# Patient Record
Sex: FEMALE | Race: WHITE | Employment: FULL TIME | URBAN - METROPOLITAN AREA
[De-identification: names, ages, dates, MRNs, and addresses within clinical notes are randomized per-mention and may not be internally consistent; named-entity substitution may affect disease eponyms.]

---

## 2020-07-07 ENCOUNTER — VIRTUAL VISIT (OUTPATIENT)
Dept: NEUROLOGY | Age: 30
End: 2020-07-07

## 2020-07-07 DIAGNOSIS — M54.81 BILATERAL OCCIPITAL NEURALGIA: ICD-10-CM

## 2020-07-07 DIAGNOSIS — G43.709 CHRONIC MIGRAINE W/O AURA W/O STATUS MIGRAINOSUS, NOT INTRACTABLE: Primary | ICD-10-CM

## 2020-07-07 DIAGNOSIS — G44.86 CERVICOGENIC HEADACHE: ICD-10-CM

## 2020-07-07 RX ORDER — LEVONORGESTREL AND ETHINYL ESTRADIOL 0.1-0.02MG
KIT ORAL
COMMUNITY
Start: 2020-06-22 | End: 2022-08-10

## 2020-07-07 RX ORDER — RIZATRIPTAN BENZOATE 10 MG/1
TABLET, ORALLY DISINTEGRATING ORAL
Qty: 9 TAB | Refills: 1 | Status: SHIPPED | OUTPATIENT
Start: 2020-07-07 | End: 2022-08-10

## 2020-07-07 RX ORDER — TOPIRAMATE 50 MG/1
50 TABLET, FILM COATED ORAL
COMMUNITY
End: 2022-08-10

## 2020-07-07 RX ORDER — BUTALBITAL, ACETAMINOPHEN AND CAFFEINE 50; 325; 40 MG/1; MG/1; MG/1
1 TABLET ORAL
Qty: 40 TAB | Refills: 0 | Status: SHIPPED | OUTPATIENT
Start: 2020-07-07 | End: 2022-08-10

## 2020-07-07 RX ORDER — CYCLOBENZAPRINE HCL 10 MG
TABLET ORAL
Qty: 60 TAB | Refills: 0 | Status: SHIPPED | OUTPATIENT
Start: 2020-07-07 | End: 2022-08-10

## 2020-07-07 RX ORDER — SUMATRIPTAN 50 MG/1
50 TABLET, FILM COATED ORAL
COMMUNITY
Start: 2020-07-06 | End: 2020-07-07

## 2020-07-07 RX ORDER — KETOROLAC TROMETHAMINE 10 MG/1
10 TABLET, FILM COATED ORAL EVERY 6 HOURS
Qty: 20 TAB | Refills: 0 | Status: SHIPPED | OUTPATIENT
Start: 2020-07-07 | End: 2020-07-12

## 2020-07-07 NOTE — PROGRESS NOTES
Libia Barrera is a 30 y.o. female who was seen by synchronous (real-time) audio-video technology on 7/7/2020 for Migraine (has had migraine for several years and they stopprd/ once stopped breast feeding and back on birth control migraines have started back up) and Other        Assessment & Plan:   Diagnoses and all orders for this visit:    1. Chronic migraine w/o aura w/o status migrainosus, not intractable  -     ketorolac (TORADOL) 10 mg tablet; Take 1 Tab by mouth every six (6) hours for 5 days. Indications: headaches  -     rizatriptan (MAXALT-MLT) 10 mg disintegrating tablet; Take 1 tab at onset of severe headache; may repeat another in 2 hours if headaches persist    2. Bilateral occipital neuralgia  -     ketorolac (TORADOL) 10 mg tablet; Take 1 Tab by mouth every six (6) hours for 5 days. Indications: headaches  -     cyclobenzaprine (FLEXERIL) 10 mg tablet; Take 1 at bedtime x 1 week; then 1 BID  -     butalbital-acetaminophen-caffeine (FIORICET, ESGIC) -40 mg per tablet; Take 1 Tab by mouth every six (6) hours as needed for Headache. 3. Cervicogenic headache  -     ketorolac (TORADOL) 10 mg tablet; Take 1 Tab by mouth every six (6) hours for 5 days. Indications: headaches  -     cyclobenzaprine (FLEXERIL) 10 mg tablet; Take 1 at bedtime x 1 week; then 1 BID  -     butalbital-acetaminophen-caffeine (FIORICET, ESGIC) -40 mg per tablet; Take 1 Tab by mouth every six (6) hours as needed for Headache. Worse headaches are consistent with migraines. Continue topiramate 50 mg twice daily. Discontinue sumatriptan. Trial of rizatriptan 10 mg ODT for abortive therapy. Prescriptions provided. Trial of ketorolac 10 mg every 6 hours x5 days to break the current cycle of her headaches. Other potential maintenance therapy includes Depakote or propranolol as well as newer CGRP medications. Advised to refrain from things that could trigger a headache (dairy, chocolate, wine and etc.).  Advised to keep a headache log. History and exam also reveals elements of occipital neuralgia due to poor anterior head posture. Advised to correct her anterior head posture. Trial of ketorolac as above to break the current cycle of the neuralgia. Trial of Fioricet for abortive therapy. Prescriptions provided. Potential occipital nerve blocks if condition worsens. History and exam also reveals elements of cervicogenic headaches due to poor anterior head posture. Advised to correct her anterior head posture. Use headrest at home, at work and while driving. Use wireless headsets. Do not carry anything on the shoulder. Use only one pillow at most when sleeping. Patient to be emailed brochure for neck and shoulder exercises to do. Patient may benefit from referral to physical therapy for more aggressive manipulation and dry needling. Trial of cyclobenzaprine 10 mg at bedtime initially and up to 10 mg twice daily if necessary. Prescriptions provided. Advised that we can certainly provide a letter for work in relation to obtaining a seat with a headrest and wireless headsets. Deferred for now. All questions and concerns were answered. This note was created using voice recognition software. Despite editing, there may be syntax errors. Subjective:   Patient is a 41-year-old right-handed white female with a history of migraine headaches who  [facility] for further evaluation of her headaches. Per patient she has had headaches since she started her menstrual cycle. She was just under the care of her previous primary care physician Dr. Albin Tran. She had a had a head CT and brain MRI that was reported to be normal.  Patient was being treated then on topiramate and as needed Imitrex with benefit. Then a few years prior to consult she was taken off medication as she wanted to start a family and was placed on fertility drugs.   During her pregnancies and subsequent deliveries patient migraine subsided. Then last December 2019 her migraines recurred with increase intensity and frequency. She saw her PCP and was restarted on topiramate and placed on Imitrex with no significant benefit. Headaches are sudden in onset. Located either on the right or left behind the eye and/or occiput. Seems to be preceded with feeling tired prior. Described as a pressure, throbbing and pounding headache. Ranging from a 7 to at its worst a 10/10. Lasts for 1 day and would linger for 2 days. Associated with vision changes, light and noise sensitivity and nausea. She would have a headache almost on a daily basis or treat to 4 times per week with severe migraine headaches occurring twice a week. Previous triggers would be caffeine and certain foods like strawberry. Headaches are worse with activity. Ice at the back of the neck or a hot bath would provide some benefit. She would take Excedrin Migraine Tylenol Motrin and other over-the-counter medications with no sustained benefit. Imitrex 50 mg offers no benefit. She is currently on Topamax 50 mg twice daily with no clear benefit but no side effects. Today is the first day she has had no headache. She does admit to problems staying asleep. Averages 5 to 6 hours. Wakes up not feeling rested. 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 Prior to Admission medications    Medication Sig Start Date End Date Taking? Authorizing Provider   topiramate (Topamax) 50 mg tablet Take 50 mg by mouth. Yes Provider, Historical   Prentis Charm 0.1-20 mg-mcg tab TAKE 1 TABLET BY MOUTH EVERY DAY 6/22/20  Yes Provider, Historical   ketorolac (TORADOL) 10 mg tablet Take 1 Tab by mouth every six (6) hours for 5 days.  Indications: headaches 7/7/20 7/12/20 Yes Jewell Salazar MD   cyclobenzaprine (FLEXERIL) 10 mg tablet Take 1 at bedtime x 1 week; then 1 BID 7/7/20  Yes Jewell Salazar MD   rizatriptan (MAXALT-MLT) 10 mg disintegrating tablet Take 1 tab at onset of severe headache; may repeat another in 2 hours if headaches persist 7/7/20  Yes Zi Mcneal MD   butalbital-acetaminophen-caffeine (FIORICET, ESGIC) -40 mg per tablet Take 1 Tab by mouth every six (6) hours as needed for Headache. 7/7/20  Yes Zi Mcneal MD     Past Medical History:   Diagnosis Date    Headache      No past surgical history on file.   Family History   Problem Relation Age of Onset    Cancer Mother     Cancer Paternal Grandmother      Social History     Tobacco Use    Smoking status: Not on file    Smokeless tobacco: Never Used   Substance Use Topics    Alcohol use: Yes     Comment: not every week and less than two when she does       ROS: pertinent as mentioned in the history    Objective:     Patient-Reported Vitals 7/7/2020   Patient-Reported Weight 155lb   Patient-Reported Height 5'        [INSTRUCTIONS:  \"[x]\" Indicates a positive item  \"[]\" Indicates a negative item  -- DELETE ALL ITEMS NOT EXAMINED]    Constitutional: [x] Appears well-developed and well-nourished [x] No apparent distress      [] Abnormal -     Mental status: [x] Alert and awake  [x] Oriented to person/place/time [x] Able to follow commands    [] Abnormal -     Eyes:   EOM    [x]  Normal    [] Abnormal -   Sclera  [x]  Normal    [] Abnormal -          Discharge [x]  None visible   [] Abnormal -     HENT: [x] Normocephalic, atraumatic  [] Abnormal -   [x] Mouth/Throat: Mucous membranes are moist    External Ears [x] Normal  [] Abnormal -    Neck: [x] No visualized mass [] Abnormal -     Pulmonary/Chest: [x] Respiratory effort normal   [x] No visualized signs of difficulty breathing or respiratory distress        [] Abnormal -      Musculoskeletal:   [x] Normal gait with no signs of ataxia         [x] Normal range of motion of neck        [] Abnormal -     Neurological:        [x] No Facial Asymmetry (Cranial nerve 7 motor function) (limited exam due to video visit)          [x] No gaze palsy        [] Abnormal -          Skin:        [x] No significant exanthematous lesions or discoloration noted on facial skin         [] Abnormal -            Psychiatric:       [x] Normal Affect [] Abnormal -        [x] No Hallucinations    Other pertinent observable physical exam findings:- anterior head posture with right shoulder lift and right torticollis    We discussed the expected course, resolution and complications of the diagnosis(es) in detail. Medication risks, benefits, costs, interactions, and alternatives were discussed as indicated. I advised her to contact the office if her condition worsens, changes or fails to improve as anticipated. She expressed understanding with the diagnosis(es) and plan. Veronika Barnett, who was evaluated through a patient-initiated, synchronous (real-time) audio-video encounter, and/or her healthcare decision maker, is aware that it is a billable service, with coverage as determined by her insurance carrier. She provided verbal consent to proceed: Yes, and patient identification was verified. It was conducted pursuant to the emergency declaration under the [address] [place] [address] authority and the [employer] General Act. A caregiver was present when appropriate. Ability to conduct physical exam was limited. I was at home. The patient was at home.       Anupam Phan MD

## 2020-07-07 NOTE — PROGRESS NOTES
Patient complains of headaches, she had them while a teen and they went away while having children, she recently stopped breastfeeding and started her birth control pills and the migraines returned.  Patient phone number to use is [phone]

## 2020-07-07 NOTE — PATIENT INSTRUCTIONS
Neck: Exercises Introduction Here are some examples of exercises for you to try. The exercises may be suggested for a condition or for rehabilitation. Start each exercise slowly. Ease off the exercises if you start to have pain. You will be told when to start these exercises and which ones will work best for you. How to do the exercises Neck stretch 1. This stretch works best if you keep your shoulder down as you lean away from it. To help you remember to do this, start by relaxing your shoulders and lightly holding on to your thighs or your chair. 2. Tilt your head toward your shoulder and hold for 15 to 30 seconds. Let the weight of your head stretch your muscles. 3. If you would like a little added stretch, use your hand to gently and steadily pull your head toward your shoulder. For example, keeping your right shoulder down, lean your head to the left. 4. Repeat 2 to 4 times toward each shoulder. Diagonal neck stretch 1. Turn your head slightly toward the direction you will be stretching, and tilt your head diagonally toward your chest and hold for 15 to 30 seconds. 2. If you would like a little added stretch, use your hand to gently and steadily pull your head forward on the diagonal. 
3. Repeat 2 to 4 times toward each side. Dorsal glide stretch The dorsal glide stretches the back of the neck. If you feel pain, do not glide so far back. Some people find this exercise easier to do while lying on their backs with an ice pack on the neck. 1. Sit or stand tall and look straight ahead. 2. Slowly tuck your chin as you glide your head backward over your body 3. Hold for a count of 6, and then relax for up to 10 seconds. 4. Repeat 8 to 12 times. Chest and shoulder stretch 1. Sit or stand tall and glide your head backward as in the dorsal glide stretch. 2. Raise both arms so that your hands are next to your ears. 3. Take a deep breath, and as you breathe out, lower your elbows down and behind your back. You will feel your shoulder blades slide down and together, and at the same time you will feel a stretch across your chest and the front of your shoulders. 4. Hold for about 6 seconds, and then relax for up to 10 seconds. 5. Repeat 8 to 12 times. Strengthening: Hands on head 1. Move your head backward, forward, and side to side against gentle pressure from your hands, holding each position for about 6 seconds. 2. Repeat 8 to 12 times. Follow-up care is a key part of your treatment and safety. Be sure to make and go to all appointments, and call your doctor if you are having problems. It's also a good idea to know your test results and keep a list of the medicines you take. Where can you learn more? Go to http://www.michael.com/ Enter P975 in the search box to learn more about \"Neck: Exercises. \" Current as of: March 2, 2020               Content Version: 12.5 © 7492-8652 [employer]. Care instructions adapted under license by [employer] (which disclaims liability or warranty for this information). If you have questions about a medical condition or this instruction, always ask your healthcare professional. [address] any warranty or liability for your use of this information. Migraine Headache: Care Instructions Your Care Instructions Migraines are painful, throbbing headaches that often start on one side of the head. They may cause nausea and vomiting and make you sensitive to light, sound, or smell. Without treatment, migraines can last from 4 hours to a few days. Medicines can help prevent migraines or stop them after they have started. Your doctor can help you find which ones work best for you. Follow-up care is a key part of your treatment and safety.  Be sure to make and go to all appointments, and call your doctor if you are having problems. It's also a good idea to know your test results and keep a list of the medicines you take. How can you care for yourself at home? · Do not drive if you have taken a prescription pain medicine. · Rest in a quiet, dark room until your headache is gone. Close your eyes, and try to relax or go to sleep. Don't watch TV or read. · Put a cold, moist cloth or cold pack on the painful area for 10 to 20 minutes at a time. Put a thin cloth between the cold pack and your skin. · Use a warm, moist towel or a heating pad set on low to relax tight shoulder and neck muscles. · Have someone gently massage your neck and shoulders. · Take your medicines exactly as prescribed. Call your doctor if you think you are having a problem with your medicine. You will get more details on the specific medicines your doctor prescribes. · Don't take medicine for headache pain too often. Talk to your doctor if you are taking medicine more than 2 days a week to stop a headache. Taking too much pain medicine can lead to more headaches. These are called medicine-overuse headaches. To prevent migraines · Keep a headache diary so you can figure out what triggers your headaches. Avoiding triggers may help you prevent headaches. Record when each headache began, how long it lasted, and what the pain was like. Write down any other symptoms you had with the headache, such as nausea, flashing lights or dark spots, or sensitivity to bright light or loud noise. Note if the headache occurred near your period. List anything that might have triggered the headache. Triggers may include certain foods (chocolate, cheese, wine) or odors, smoke, bright light, stress, or lack of sleep. · If your doctor has prescribed medicine for your migraines, take it as directed.  You may have medicine that you take only when you get a migraine and medicine that you take all the time to help prevent migraines. ? If your doctor has prescribed medicine for when you get a headache, take it at the first sign of a migraine, unless your doctor has given you other instructions. ? If your doctor has prescribed medicine to prevent migraines, take it exactly as prescribed. Call your doctor if you think you are having a problem with your medicine. · Find healthy ways to deal with stress. Migraines are most common during or right after stressful times. Try finding ways to reduce stress like practicing mindfulness or deep breathing exercises. · Get plenty of sleep and exercise. But be careful to not push yourself too hard during exercise. It may trigger a headache. · Eat meals on a regular schedule. Avoid foods and drinks that often trigger migraines. These include chocolate, alcohol (especially red wine and port), aspartame, monosodium glutamate (MSG), and some additives found in foods (such as hot dogs, justin, cold cuts, aged cheeses, and pickled foods). · Limit caffeine. Don't drink too much coffee, tea, or soda. But don't quit caffeine suddenly. That can also give you migraines. · Do not smoke or allow others to smoke around you. If you need help quitting, talk to your doctor about stop-smoking programs and medicines. These can increase your chances of quitting for good. · If you are taking birth control pills or hormone therapy, talk to your doctor about whether they are triggering your migraines. When should you call for help? [id number] anytime you think you may need emergency care. For example, call if: 
· You have signs of a stroke. These may include: 
? Sudden numbness, paralysis, or weakness in your face, arm, or leg, especially on only one side of your body. ? Sudden vision changes. ? Sudden trouble speaking. ? Sudden confusion or trouble understanding simple statements. ? Sudden problems with walking or balance. ? A sudden, severe headache that is different from past headaches. Call your doctor now or seek immediate medical care if: 
· You have new or worse nausea and vomiting. · You have a new or higher fever. · Your headache gets much worse. Watch closely for changes in your health, and be sure to contact your doctor if: 
· You are not getting better after 2 days (48 hours). Where can you learn more? Go to http://minnie-jeff.info/ Enter Q227 in the search box to learn more about \"Migraine Headache: Care Instructions. \" Current as of: November 20, 2019               Content Version: 12.5 © 7278-6614 [employer]. Care instructions adapted under license by [employer] (which disclaims liability or warranty for this information). If you have questions about a medical condition or this instruction, always ask your healthcare professional. [address] any warranty or liability for your use of this information.

## 2020-07-10 ENCOUNTER — TELEPHONE (OUTPATIENT)
Dept: NEUROLOGY | Age: 30
End: 2020-07-10

## 2020-07-28 ENCOUNTER — TELEPHONE (OUTPATIENT)
Dept: NEUROLOGY | Age: 30
End: 2020-07-28

## 2022-08-10 ENCOUNTER — HOSPITAL ENCOUNTER (EMERGENCY)
Age: 32
Discharge: HOME | End: 2022-08-10
Attending: EMERGENCY MEDICINE | Admitting: EMERGENCY MEDICINE
Payer: COMMERCIAL

## 2022-08-10 VITALS
OXYGEN SATURATION: 98 % | WEIGHT: 160 LBS | HEART RATE: 80 BPM | HEIGHT: 60 IN | RESPIRATION RATE: 16 BRPM | SYSTOLIC BLOOD PRESSURE: 130 MMHG | BODY MASS INDEX: 31.41 KG/M2 | DIASTOLIC BLOOD PRESSURE: 87 MMHG | TEMPERATURE: 97.9 F

## 2022-08-10 DIAGNOSIS — R51.9 NONINTRACTABLE HEADACHE, UNSPECIFIED CHRONICITY PATTERN, UNSPECIFIED HEADACHE TYPE: Primary | ICD-10-CM

## 2022-08-10 PROCEDURE — 96374 THER/PROPH/DIAG INJ IV PUSH: CPT | Performed by: EMERGENCY MEDICINE

## 2022-08-10 PROCEDURE — 74011250636 HC RX REV CODE- 250/636: Performed by: EMERGENCY MEDICINE

## 2022-08-10 PROCEDURE — 99284 EMERGENCY DEPT VISIT MOD MDM: CPT | Performed by: EMERGENCY MEDICINE

## 2022-08-10 PROCEDURE — 96375 TX/PRO/DX INJ NEW DRUG ADDON: CPT | Performed by: EMERGENCY MEDICINE

## 2022-08-10 RX ORDER — KETOROLAC TROMETHAMINE 30 MG/ML
30 INJECTION, SOLUTION INTRAMUSCULAR; INTRAVENOUS
Status: COMPLETED | OUTPATIENT
Start: 2022-08-10 | End: 2022-08-10

## 2022-08-10 RX ORDER — DEXAMETHASONE SODIUM PHOSPHATE 10 MG/ML
10 INJECTION INTRAMUSCULAR; INTRAVENOUS ONCE
Status: COMPLETED | OUTPATIENT
Start: 2022-08-10 | End: 2022-08-10

## 2022-08-10 RX ORDER — PROCHLORPERAZINE EDISYLATE 5 MG/ML
10 INJECTION INTRAMUSCULAR; INTRAVENOUS ONCE
Status: COMPLETED | OUTPATIENT
Start: 2022-08-10 | End: 2022-08-10

## 2022-08-10 RX ORDER — SUMATRIPTAN 25 MG/1
25 TABLET, FILM COATED ORAL
COMMUNITY
Start: 2022-07-18

## 2022-08-10 RX ADMIN — DEXAMETHASONE SODIUM PHOSPHATE 10 MG: 10 INJECTION, SOLUTION INTRAMUSCULAR; INTRAVENOUS at 15:10

## 2022-08-10 RX ADMIN — KETOROLAC TROMETHAMINE 30 MG: 30 INJECTION, SOLUTION INTRAMUSCULAR; INTRAVENOUS at 15:09

## 2022-08-10 RX ADMIN — SODIUM CHLORIDE 1000 ML: 9 INJECTION, SOLUTION INTRAVENOUS at 15:13

## 2022-08-10 RX ADMIN — PROCHLORPERAZINE EDISYLATE 10 MG: 5 INJECTION INTRAMUSCULAR; INTRAVENOUS at 15:08

## 2022-08-10 NOTE — ED PROVIDER NOTES
31F w/ hx of chronic migraines p/w HA. Pt reports gradual onset left sided HA that feels similar to her typical and very frequent migraine HA. She reports pain behind her left eye that has been sharp and constant. Took 2 doses of of sumatriptan w/o relief. No CP/SOB, abd pain. She reports severe nausea but no vomiting. Denies any recent head injuries, hx of malignancy, brain surgeries, neck pain/stiffness, fevers, vomiting, vision/speech changes, gait abnl, facial droop, ext weakness/numbness, syncope, seizure or hx of intracranial aneurysms. No exogenous estrogen supplements or OCPs. Past Medical History:   Diagnosis Date    Headache        No past surgical history on file. Family History:   Problem Relation Age of Onset    Cancer Mother     Cancer Paternal Grandmother        Social History     Socioeconomic History    Marital status: UNKNOWN     Spouse name: Not on file    Number of children: Not on file    Years of education: Not on file    Highest education level: Not on file   Occupational History    Not on file   Tobacco Use    Smoking status: Not on file    Smokeless tobacco: Never   Substance and Sexual Activity    Alcohol use: Yes     Comment: not every week and less than two when she does    Drug use: Not on file    Sexual activity: Not on file   Other Topics Concern    Not on file   Social History Narrative    Not on file     Social Determinants of Health     Financial Resource Strain: Not on file   Food Insecurity: Not on file   Transportation Needs: Not on file   Physical Activity: Not on file   Stress: Not on file   Social Connections: Not on file   Intimate Partner Violence: Not on file   Housing Stability: Not on file         ALLERGIES: Patient has no known allergies. Review of Systems   Constitutional:  Negative for chills, diaphoresis and fever. HENT:  Negative for facial swelling, mouth sores, nosebleeds, trouble swallowing and voice change.     Eyes:  Negative for pain and visual disturbance. Respiratory:  Negative for apnea, cough, choking, shortness of breath, wheezing and stridor. Cardiovascular:  Negative for chest pain, palpitations and leg swelling. Gastrointestinal:  Positive for nausea. Negative for abdominal distention, abdominal pain, blood in stool, diarrhea and vomiting. Genitourinary:  Negative for difficulty urinating, dysuria, flank pain, hematuria and pelvic pain. Musculoskeletal:  Negative for joint swelling. Skin:  Negative for color change and rash. Allergic/Immunologic: Negative for immunocompromised state. Neurological:  Positive for headaches. Negative for dizziness, seizures, syncope, speech difficulty and light-headedness. Hematological:  Does not bruise/bleed easily. Psychiatric/Behavioral:  Negative for agitation and behavioral problems. There were no vitals filed for this visit. Physical Exam  Vitals and nursing note reviewed. Constitutional:       General: She is not in acute distress. Appearance: Normal appearance. She is not ill-appearing or toxic-appearing. HENT:      Head: Normocephalic and atraumatic. Right Ear: External ear normal.      Left Ear: External ear normal.      Nose: Nose normal.      Mouth/Throat:      Mouth: Mucous membranes are moist.      Pharynx: Oropharynx is clear. No oropharyngeal exudate or posterior oropharyngeal erythema. Eyes:      General: No scleral icterus. Extraocular Movements: Extraocular movements intact. Conjunctiva/sclera: Conjunctivae normal.      Pupils: Pupils are equal, round, and reactive to light. Cardiovascular:      Rate and Rhythm: Normal rate and regular rhythm. Pulses: Normal pulses. Heart sounds: Normal heart sounds. No murmur heard. No friction rub. No gallop. Pulmonary:      Effort: Pulmonary effort is normal. No respiratory distress. Breath sounds: Normal breath sounds. No stridor. No wheezing, rhonchi or rales.    Abdominal: General: There is no distension. Palpations: Abdomen is soft. Tenderness: There is no abdominal tenderness. There is no guarding or rebound. Musculoskeletal:         General: No tenderness or deformity. Normal range of motion. Cervical back: Normal range of motion and neck supple. No rigidity. Right lower leg: No edema. Left lower leg: No edema. Skin:     General: Skin is warm. Capillary Refill: Capillary refill takes less than 2 seconds. Coloration: Skin is not jaundiced. Neurological:      General: No focal deficit present. Mental Status: She is alert. Cranial Nerves: No cranial nerve deficit. Sensory: No sensory deficit. Motor: No weakness. Coordination: Coordination normal.      Comments: -GCS15, AAox3  -Normal b/l UE/LE motor/sensory exam  -CN2-12 intact including able to smile/frown, elevate eyebrows symmetrically, close eyes tightly against force, sensation to light touch intact V1-V3 distribution, hearing intact to finger rub b/l, palate/uvula elevate midline/symmetrically, able to shrug shoulders and move head left/right against force, tongue protrudes midline  -EOMI, PERRL, no nystagmus, normal visual fields, nl speech w/o dysarthria/aphasia  -no pronator drift  -cerebellar testing intact including rapid/alternating movements, finger-to-nose/shin-to-heel  -normal gait, no ataxia, negative rhomberg     Psychiatric:         Mood and Affect: Mood normal.         Behavior: Behavior normal.         Thought Content: Thought content normal.         Judgment: Judgment normal.        MDM  Number of Diagnoses or Management Options  Nonintractable headache, unspecified chronicity pattern, unspecified headache type  Diagnosis management comments: 31F w/ hx of chronic migraines p/w HA x1d. Pt nontoxic, hemodynamically stable w/o resp distress.  On exam, GCS15, AAOx3, no focal neuro deficits including nl CN2-12, motor/sensory, visual fields, gait, cerebellar testing. No red flag signs/symptoms on hx or exam to suggest increased ICP or serious secondary cause of headache (very unlikely bleed, mass, CNS infection w/o AMS, fever, focal neuro deficits or meningeal signs). Would not obtain emergent neuroimaging or LP at this time. Likely primary headache ie migraine/tension/cluster/rebound. Symptoms improved after compazine/toradol/decadron and IVF. Advised to f/u neurology as outpatient. Script for fioricet. Patient given specific return precautions and explained signs/symptoms for which to come back to ED immediately but otherwise advised to f/u w/ PCP over next 2-3days.     Risk of Complications, Morbidity, and/or Mortality  Presenting problems: moderate  Diagnostic procedures: moderate  Management options: moderate           Procedures

## 2022-08-10 NOTE — ED TRIAGE NOTES
Patient reports having a migraine rating a 10 out of 10 starting and dizziness about 2.5 hours ago. Patient denies numbness or tingling. Patient denies syncopal episodes.

## 2022-08-11 RX ORDER — BUTALBITAL, ACETAMINOPHEN AND CAFFEINE 300; 40; 50 MG/1; MG/1; MG/1
1 CAPSULE ORAL
Qty: 29 CAPSULE | Refills: 0 | Status: SHIPPED | OUTPATIENT
Start: 2022-08-11

## 2023-02-16 ENCOUNTER — OFFICE VISIT (OUTPATIENT)
Dept: NEUROLOGY | Age: 33
End: 2023-02-16
Payer: COMMERCIAL

## 2023-02-16 VITALS
TEMPERATURE: 97.8 F | OXYGEN SATURATION: 99 % | BODY MASS INDEX: 31.44 KG/M2 | SYSTOLIC BLOOD PRESSURE: 110 MMHG | WEIGHT: 161 LBS | DIASTOLIC BLOOD PRESSURE: 82 MMHG | HEART RATE: 72 BPM

## 2023-02-16 DIAGNOSIS — G43.909 MIGRAINE WITHOUT STATUS MIGRAINOSUS, NOT INTRACTABLE, UNSPECIFIED MIGRAINE TYPE: Primary | ICD-10-CM

## 2023-02-16 PROCEDURE — 99214 OFFICE O/P EST MOD 30 MIN: CPT | Performed by: NURSE PRACTITIONER

## 2023-02-16 RX ORDER — ATOGEPANT 60 MG/1
TABLET ORAL
COMMUNITY
End: 2023-02-16 | Stop reason: SDUPTHER

## 2023-02-16 RX ORDER — RIMEGEPANT SULFATE 75 MG/75MG
TABLET, ORALLY DISINTEGRATING ORAL
Qty: 8 TABLET | Refills: 5 | Status: SHIPPED | OUTPATIENT
Start: 2023-02-16

## 2023-02-16 RX ORDER — ATOGEPANT 60 MG/1
1 TABLET ORAL DAILY
Qty: 90 TABLET | Refills: 1 | Status: SHIPPED | OUTPATIENT
Start: 2023-02-16

## 2023-02-16 NOTE — PROGRESS NOTES
Pt is on qulipta   PCP has been handling migraines, has been on samples as she has insurance issues  Monthly having 2-3 with Courtney Eastman with out med having 2-3 weekly   Triptan helps a little bit, takes the edge off  Nausea and vomiting, makes it worse  Some blurred vision and photophobic   Mentioned she had a scan at 16years, would like another to compare

## 2023-02-20 NOTE — PROGRESS NOTES
Nancy Narayan is a 27 y.o. female who presents with the following  Chief Complaint   Patient presents with    Follow-up       HPI    Patient comes in for follow-up for headaches  She is currently on Sobeida Cadet through her primary care and they did a PA and it was denied  She has been on this and is only having about 2-3 migraines a month  Since she had started this  Before this she was having about 2-3 a week  She does state they are unilateral  Sharp stabbing pains  She does have nausea and vomiting  She does have some changes in her vision in regard to blurry and photophobic  She has hypersensitivity to smell  She does work on the computer  She does have 2 little kids who keep her active  She had a scan when she was 16 but nothing since then  She does take a triptan that helps a little bit but it causes some side effects  She has been on Topamax and amitriptyline in the past with no relief  The Sobeida Cadet is working really well overall and has dropped her migraines down significantly        No Known Allergies    Current Outpatient Medications   Medication Sig    rimegepant (Nurtec ODT) 75 mg disintegrating tablet 1 tablet at HA onset PRN. Max 1 tablet in 24 hours. atogepant (Qulipta) 60 mg tab Take 1 Tablet by mouth daily. SUMAtriptan (IMITREX) 25 mg tablet Take 25 mg by mouth two (2) times daily as needed for Migraine. No current facility-administered medications for this visit. Social History     Tobacco Use   Smoking Status Never   Smokeless Tobacco Never       Past Medical History:   Diagnosis Date    Headache        History reviewed. No pertinent surgical history.     Family History   Problem Relation Age of Onset    Cancer Mother     Cancer Paternal Grandmother        Social History     Socioeconomic History    Marital status:    Tobacco Use    Smoking status: Never    Smokeless tobacco: Never   Vaping Use    Vaping Use: Never used   Substance and Sexual Activity    Alcohol use: Not Currently     Comment: not every week and less than two when she does    Drug use: Never       Review of Systems   Eyes:  Positive for blurred vision and photophobia. Negative for double vision. Gastrointestinal:  Negative for nausea and vomiting. Neurological:  Positive for headaches. Negative for dizziness, tingling, tremors, sensory change and speech change. Remainder of comprehensive review is negative. Physical Exam :    Visit Vitals  /82 (BP 1 Location: Left upper arm, BP Patient Position: Sitting, BP Cuff Size: Adult)   Pulse 72   Temp 97.8 °F (36.6 °C)   Wt 73 kg (161 lb)   SpO2 99%   BMI 31.44 kg/m²       General: Well defined, nourished, and groomed individual in no acute distress. Musculoskeletal: Extremities revealed no edema and had full range of motion of joints. Psych: Good mood and bright affect    NEUROLOGICAL EXAMINATION:    Mental Status: Alert and oriented to person, place, and time    Cranial Nerves:    II, III, IV, VI: Visual acuity grossly intact. Visual fields are normal.    Pupils are equal, round, and reactive to light and accommodation. Extra-ocular movements are full and fluid. Fundoscopic exam was benign, no ptosis or nystagmus. V-XII: Hearing is grossly intact. Facial features are symmetric, with normal sensation and strength. The palate rises symmetrically and the tongue protrudes midline. Sternocleidomastoids 5/5. Motor Examination: Normal tone, bulk, and strength, 5/5 muscle strength throughout. Coordination: Finger to nose was normal. No resting or intention tremor    Gait and Station: Steady while walking. Normal arm swing. No pronator drift. No muscle wasting or fasiculations noted. Reflexes: DTRs 2+ throughout. No results found for this or any previous visit. Orders Placed This Encounter    DISCONTD: atogepant (Qulipta) 60 mg tab     Sig: Take  by mouth.     rimegepant (Nurtec ODT) 75 mg disintegrating tablet     Si tablet at HA onset PRN. Max 1 tablet in 24 hours. Dispense:  8 Tablet     Refill:  5    atogepant (Qulipta) 60 mg tab     Sig: Take 1 Tablet by mouth daily. Dispense:  90 Tablet     Refill:  1       1.  Migraine without status migrainosus, not intractable, unspecified migraine type        Discussed her symptoms in full  She is doing really well on the Sobeida Cadet so we do need to get this approved through insurance  She has failed all the necessary other rescues and preventative she needs to  Try Nurtec as needed for rescue here  Keep track of headaches            [facility]

## 2024-10-08 ENCOUNTER — TELEPHONE (OUTPATIENT)
Age: 34
End: 2024-10-08

## 2024-10-08 ENCOUNTER — OFFICE VISIT (OUTPATIENT)
Age: 34
End: 2024-10-08
Payer: COMMERCIAL

## 2024-10-08 VITALS
RESPIRATION RATE: 20 BRPM | SYSTOLIC BLOOD PRESSURE: 124 MMHG | OXYGEN SATURATION: 99 % | HEART RATE: 70 BPM | DIASTOLIC BLOOD PRESSURE: 68 MMHG

## 2024-10-08 DIAGNOSIS — G43.709 CHRONIC MIGRAINE W/O AURA W/O STATUS MIGRAINOSUS, NOT INTRACTABLE: Primary | ICD-10-CM

## 2024-10-08 PROCEDURE — 99204 OFFICE O/P NEW MOD 45 MIN: CPT

## 2024-10-08 RX ORDER — RIMEGEPANT SULFATE 75 MG/75MG
75 TABLET, ORALLY DISINTEGRATING ORAL PRN
Qty: 8 TABLET | Refills: 5 | Status: SHIPPED | OUTPATIENT
Start: 2024-10-08

## 2024-10-08 RX ORDER — GALCANEZUMAB 120 MG/ML
120 INJECTION, SOLUTION SUBCUTANEOUS
Qty: 1 ML | Refills: 5 | Status: SHIPPED | OUTPATIENT
Start: 2024-10-08

## 2024-10-08 ASSESSMENT — ENCOUNTER SYMPTOMS
VOMITING: 1
PHOTOPHOBIA: 1
NAUSEA: 1

## 2024-10-08 NOTE — PROGRESS NOTES
Qulipta was not ever approved   Migraines- after May since she stopped nursing they have started to pick back up to atleast 2 to maybe a couple of more a week, lasting all day if she can't catch them a few hours after she can take the sumatriptan

## 2024-10-08 NOTE — PROGRESS NOTES
Maryse Loya is a 34 y.o. female who presents with the following  Chief Complaint   Patient presents with    Follow-up     Migraines        HPI  Patient is here today for migraine follow-up.  Patient was last seen February 16, 2023 by Robby Beard NP at which time the patient was having 2 - 3 migraines a week with nausea vomiting, blurry vision, photophobia, phonophobia.  The patient had been using Qulipta samples through her PCP but the PA was reportedly denied by her insurance company.  She said while she was on the Qulipta samples that she was only having 2 or 3 migraines a month.  Robby noted that he was going to attempt to get a prior authorization for the Qulipta and Nurtec for rescue therapy.  Today the patient tells me that these medications were denied.    The patient tells me today that she had to stop all medications for migraines shortly after her last visit here because she became pregnant.  Her migraines were controlled while she was pregnant and breast-feeding.  She stopped breast-feeding May of this year.  Now that she has finished breast-feeding, her migraines are starting to increase.  She says that she is having 2+ migraines each week and they last all day.  These headaches occur mostly at her forehead and above her eyes.  She also has pain in her left temple.  The headaches feel stabbing and extreme pressure in nature.  She still has photophobia, phonophobia, nausea and vomiting with these headaches.  No Aura.  She does have sumatriptan rescue therapy which really only takes the edge off but this medication makes her feel weird and tingly and gives her slurred speech for the entire day.  She also takes over-the-counter Tylenol and ibuprofen which are not very effective.  The patient is interested in trying a new medication for prevention and rescue therapy today.    Tried/failed: Topamax, amitriptyline, is not a candidate for blood pressure medication as she is normotensive, rizatriptan,

## 2024-10-08 NOTE — TELEPHONE ENCOUNTER
RE:Emgality 120 mg auto injector pa request sent to [employer] via cmm    (Key: [id number])    Status-available without authorization    Nurtec 75 mg tablet-    Key: [id number]     Status-available without authorization     FYI to nurse

## 2025-03-25 NOTE — PROGRESS NOTES
Maryse Loya is a 34 y.o. female who presents with the following  Chief Complaint   Patient presents with    Follow-up     Migraines, med eval      Last office visit note  HPI  Patient is here today for migraine follow-up.  Patient was last seen February 16, 2023 by Robby Beard NP at which time the patient was having 2 - 3 migraines a week with nausea vomiting, blurry vision, photophobia, phonophobia.  The patient had been using Qulipta samples through her PCP but the PA was reportedly denied by her insurance company.  She said while she was on the Qulipta samples that she was only having 2 or 3 migraines a month.  Robby noted that he was going to attempt to get a prior authorization for the Qulipta and Nurtec for rescue therapy.  Today the patient tells me that these medications were denied.     The patient tells me today that she had to stop all medications for migraines shortly after her last visit here because she became pregnant.  Her migraines were controlled while she was pregnant and breast-feeding.  She stopped breast-feeding May of this year.  Now that she has finished breast-feeding, her migraines are starting to increase.  She says that she is having 2+ migraines each week and they last all day.  These headaches occur mostly at her forehead and above her eyes.  She also has pain in her left temple.  The headaches feel stabbing and extreme pressure in nature.  She still has photophobia, phonophobia, nausea and vomiting with these headaches.  No Aura.  She does have sumatriptan rescue therapy which really only takes the edge off but this medication makes her feel weird and tingly and gives her slurred speech for the entire day.  She also takes over-the-counter Tylenol and ibuprofen which are not very effective.  The patient is interested in trying a new medication for prevention and rescue therapy today.     Tried/failed: Topamax, amitriptyline, is not a candidate for blood pressure medication as

## 2025-03-26 ENCOUNTER — OFFICE VISIT (OUTPATIENT)
Age: 35
End: 2025-03-26
Payer: COMMERCIAL

## 2025-03-26 VITALS
RESPIRATION RATE: 20 BRPM | OXYGEN SATURATION: 98 % | SYSTOLIC BLOOD PRESSURE: 124 MMHG | DIASTOLIC BLOOD PRESSURE: 76 MMHG | HEART RATE: 77 BPM

## 2025-03-26 DIAGNOSIS — G43.709 CHRONIC MIGRAINE W/O AURA W/O STATUS MIGRAINOSUS, NOT INTRACTABLE: Primary | ICD-10-CM

## 2025-03-26 PROCEDURE — 99214 OFFICE O/P EST MOD 30 MIN: CPT

## 2025-03-26 RX ORDER — TOPIRAMATE 25 MG/1
TABLET, FILM COATED ORAL
Qty: 60 TABLET | Refills: 3 | Status: SHIPPED | OUTPATIENT
Start: 2025-03-26

## 2025-03-26 ASSESSMENT — ENCOUNTER SYMPTOMS
NAUSEA: 1
VOMITING: 1

## 2025-03-26 NOTE — PROGRESS NOTES
Migraines-  have been pretty good except for last Thursday she got one and it has lingered since then until today   The pills work   Will try the nurtec first- dissolvable and its quick   If that doesn't work then she will go to the sumatriptan     If its a really bad one and comes on really quick she will go ahead and try the sumatriptan first to knock it out

## 2025-07-31 ENCOUNTER — OFFICE VISIT (OUTPATIENT)
Age: 35
End: 2025-07-31
Payer: COMMERCIAL

## 2025-07-31 VITALS
HEART RATE: 67 BPM | DIASTOLIC BLOOD PRESSURE: 80 MMHG | SYSTOLIC BLOOD PRESSURE: 124 MMHG | OXYGEN SATURATION: 99 % | RESPIRATION RATE: 20 BRPM

## 2025-07-31 DIAGNOSIS — G43.709 CHRONIC MIGRAINE W/O AURA W/O STATUS MIGRAINOSUS, NOT INTRACTABLE: Primary | ICD-10-CM

## 2025-07-31 PROCEDURE — 99214 OFFICE O/P EST MOD 30 MIN: CPT

## 2025-07-31 NOTE — PROGRESS NOTES
Frequent- maybe 2 sumatriptan a week sometimes 3 but those are the ones that are going to be really bad     Sumatriptan works pretty well, side effects but it does knock the headaches out   Would take a full hour once it fully kicks in       
    Coordination: No resting or intention tremor    Gait and Station: Steady while walking. Normal arm swing. No pronator drift. No muscle wasting or fasiculations noted.     No orders of the defined types were placed in this encounter.       1. Chronic migraine w/o aura w/o status migrainosus, not intractable    Advised that it would be just fine if she wanted to restart the Topamax at 25 mg nightly until she feels comfortable with that dose and then she can increase it to 50 mg nightly.  Continue using sumatriptan 100 mg as needed for rescue therapy.  Continue to only take rescue medicines including Excedrin 2 to 3 days a week and not anymore.    Patient will follow-up in about 4 months    Return in about 4 months (around 11/30/2025).

## 2025-09-04 RX ORDER — SUMATRIPTAN SUCCINATE 100 MG/1
TABLET ORAL
Qty: 9 TABLET | Refills: 0 | Status: SHIPPED | OUTPATIENT
Start: 2025-09-04